# Patient Record
(demographics unavailable — no encounter records)

---

## 2025-03-26 NOTE — PHYSICAL EXAM
[Abdomen Tenderness] : ~T ~M No abdominal tenderness [Alert] : alert [de-identified] : cheerful and cooperative young man, well nourished appearing [de-identified] : flat, soft, incisions x 3 healed without discernible trocar site hernias [de-identified] : no calf tenderness, no ankle edema

## 2025-03-26 NOTE — HISTORY OF PRESENT ILLNESS
[de-identified] : Feeling better, no abdominal pain, no nausea, no vomiting.  Eating normally and having normal BMs.